# Patient Record
Sex: FEMALE | Employment: FULL TIME | URBAN - METROPOLITAN AREA
[De-identification: names, ages, dates, MRNs, and addresses within clinical notes are randomized per-mention and may not be internally consistent; named-entity substitution may affect disease eponyms.]

---

## 2018-03-13 ENCOUNTER — OFFICE VISIT (OUTPATIENT)
Dept: SURGERY | Age: 28
End: 2018-03-13

## 2018-03-13 VITALS
WEIGHT: 162 LBS | BODY MASS INDEX: 25.43 KG/M2 | SYSTOLIC BLOOD PRESSURE: 123 MMHG | DIASTOLIC BLOOD PRESSURE: 78 MMHG | HEART RATE: 83 BPM | HEIGHT: 67 IN

## 2018-03-13 DIAGNOSIS — N63.10 BREAST MASS, RIGHT: Primary | ICD-10-CM

## 2018-03-13 RX ORDER — DEXTROAMPHETAMINE SACCHARATE, AMPHETAMINE ASPARTATE MONOHYDRATE, DEXTROAMPHETAMINE SULFATE AND AMPHETAMINE SULFATE 7.5; 7.5; 7.5; 7.5 MG/1; MG/1; MG/1; MG/1
30 CAPSULE, EXTENDED RELEASE ORAL
COMMUNITY

## 2018-03-13 RX ORDER — NORETHINDRONE ACETATE AND ETHINYL ESTRADIOL .03; 1.5 MG/1; MG/1
TABLET ORAL
COMMUNITY

## 2018-03-13 RX ORDER — ESCITALOPRAM OXALATE 10 MG/1
10 TABLET ORAL DAILY
COMMUNITY
End: 2020-11-17 | Stop reason: ALTCHOICE

## 2018-03-13 NOTE — COMMUNICATION BODY
HISTORY OF PRESENT ILLNESS  Macarena Maya is a 32 y.o. female. HPI NEW patient referral for consultation by Dr. Kevin Nunez for a RIGHT breast lump palpated by her gyn and not palpable by the patient. The patient denies any nipple inversion or discharge. The patient has not had any imaging. Obstetric History     No data available      Obstetric Comments   Menarche:  12. LMP:3/7/2018 . # of Children: no.  Age at Delivery of First Child:  n/a. Hysterectomy/oophorectomy: no/no. Breast Bx:  none. Hx of Breast Feeding: na. BCP: yes currently. Hormone therapy: none. No family history of breast or ovarian cancer. No imaging     Review of Systems   Constitutional: Negative. HENT: Negative. Eyes: Negative. Respiratory: Negative. Cardiovascular: Negative. Gastrointestinal: Negative. Genitourinary: Negative. Musculoskeletal: Negative. Skin: Negative. Neurological: Negative. Endo/Heme/Allergies: Negative. Psychiatric/Behavioral: Negative. Physical Exam   Pulmonary/Chest: Right breast exhibits mass (4 cm dense lobular area LOQ. no discrete mass. no skin dimple). Right breast exhibits no inverted nipple, no nipple discharge, no skin change and no tenderness. Left breast exhibits no inverted nipple, no mass, no nipple discharge, no skin change and no tenderness. Breasts are symmetrical.       Lymphadenopathy:     She has no cervical adenopathy. She has no axillary adenopathy. Right: No supraclavicular adenopathy present. Left: No supraclavicular adenopathy present. BREAST ULTRASOUND  Indication: Right  breast mass LOQ  Technique: The area was scanned using a high-frequency linear-array near-field transducer  Findings: No abnormal mass, lesion, or shadowing noted. 1.6 cm long flat cyst deep in the breast 10:00 1/3. Incidental finding 11:00 1/3 8 mm oval hypoechoic mass without shadowing   Impression: 1. No suspicious mass  2.  Benign cyst.   3. Benign appearing mass 11;00 1/3  Disposition: repeat ultrasound RIGHT breast mass 11:00 1/3  ASSESSMENT and PLAN    ICD-10-CM ICD-9-CM    1. Breast mass, right N63.10 611.72      RIGHT breast mass is the edge of her normal dense breast tissue. No abnormal finding on ultrasound. Discussed self breast exam and explained what a noncancerous mass feels like (smooth, round and mobile) compared with a cancerous mass (hard, fixed, bumpy and often associated with a skin dimple).   PRN follow up

## 2018-03-13 NOTE — PROGRESS NOTES
HISTORY OF PRESENT ILLNESS  Sharifa Jarrett is a 32 y.o. female. HPI NEW patient referral for consultation by Dr. Cindy Cristina for a RIGHT breast lump palpated by her gyn and not palpable by the patient. The patient denies any nipple inversion or discharge. The patient has not had any imaging. Obstetric History     No data available      Obstetric Comments   Menarche:  12. LMP:3/7/2018 . # of Children: no.  Age at Delivery of First Child:  n/a. Hysterectomy/oophorectomy: no/no. Breast Bx:  none. Hx of Breast Feeding: na. BCP: yes currently. Hormone therapy: none. No family history of breast or ovarian cancer. No imaging     Review of Systems   Constitutional: Negative. HENT: Negative. Eyes: Negative. Respiratory: Negative. Cardiovascular: Negative. Gastrointestinal: Negative. Genitourinary: Negative. Musculoskeletal: Negative. Skin: Negative. Neurological: Negative. Endo/Heme/Allergies: Negative. Psychiatric/Behavioral: Negative. Physical Exam   Pulmonary/Chest: Right breast exhibits mass (4 cm dense lobular area LOQ. no discrete mass. no skin dimple). Right breast exhibits no inverted nipple, no nipple discharge, no skin change and no tenderness. Left breast exhibits no inverted nipple, no mass, no nipple discharge, no skin change and no tenderness. Breasts are symmetrical.       Lymphadenopathy:     She has no cervical adenopathy. She has no axillary adenopathy. Right: No supraclavicular adenopathy present. Left: No supraclavicular adenopathy present. BREAST ULTRASOUND  Indication: Right  breast mass LOQ  Technique: The area was scanned using a high-frequency linear-array near-field transducer  Findings: No abnormal mass, lesion, or shadowing noted. 1.6 cm long flat cyst deep in the breast 10:00 1/3. Incidental finding 11:00 1/3 8 mm oval hypoechoic mass without shadowing   Impression: 1. No suspicious mass  2.  Benign cyst.   3. Benign appearing mass 11;00 1/3  Disposition: repeat ultrasound RIGHT breast mass 11:00 1/3  ASSESSMENT and PLAN    ICD-10-CM ICD-9-CM    1. Breast mass, right N63.10 611.72      RIGHT breast mass is the edge of her normal dense breast tissue. No abnormal finding on ultrasound. Discussed self breast exam and explained what a noncancerous mass feels like (smooth, round and mobile) compared with a cancerous mass (hard, fixed, bumpy and often associated with a skin dimple).   PRN follow up

## 2018-03-13 NOTE — MR AVS SNAPSHOT
Rafael Dominguez 
 
 
 [place]  [place] [address] [facility] 
[phone] Patient: Rafi Knutson MRN: [id number] :1990 Visit Information Date & Time Provider Department Dept. Phone Encounter #  
 3/13/2018  2:00 PM Vivi Pulido MD [facility] [phone] [id number] Your Appointments 2018  1:45 PM  
Follow Up with Vivi Pulido MD  
[facility] Appt Note: 3 month follow up/ cp$ 3-13-18   
 [place]  [place] [facility] [address]  
  
   
 [place]  [place] [address] [facility] Upcoming Health Maintenance Date Due DTaP/Tdap/Td series (1 - Tdap) 10/23/2011 PAP AKA CERVICAL CYTOLOGY 10/23/2011 Influenza Age 5 to Adult 2017 Allergies as of 3/13/2018  Review Complete On: 3/13/2018 By: Vivi Pulido MD  
 No Known Allergies Current Immunizations  Never Reviewed No immunizations on file. Not reviewed this visit Vitals BP Pulse Height(growth percentile) Weight(growth percentile) LMP BMI  
 123/78 83 5' 7\" (1.702 m) 162 lb (73.5 kg) 2018 (Exact Date) 25.37 kg/m2 OB Status Smoking Status Having regular periods Never Smoker BMI and BSA Data Body Mass Index Body Surface Area  
 25.37 kg/m 2 1.86 m 2 Your Updated Medication List  
  
   
This list is accurate as of 3/13/18  3:41 PM.  Always use your most recent med list.  
  
  
  
  
 ADDERALL XR 30 mg XR capsule Generic drug:  amphetamine-dextroamphetamine XR Take 30 mg by mouth every morning. LEXAPRO 10 mg tablet Generic drug:  escitalopram oxalate Take 10 mg by mouth daily. LOESTRIN 1.5/30 (21) 1.5-30 mg-mcg Tab Generic drug:  norethindrone ac-eth estradiol Take  by mouth. Introducing [facility] [place] [facility]! [employer] introduces 56.com patient portal. Now you can access parts of your medical record, email your doctor's office, and request medication refills online. 1. In your internet browser, go to [email or web address] 2. Click on the First Time User? Click Here link in the Sign In box. You will see the New Member Sign Up page. 3. Enter your 56.com Access Code exactly as it appears below. You will not need to use this code after youve completed the sign-up process. If you do not sign up before the expiration date, you must request a new code. · [email or web address].com Access Code: [email or web address] Expires: 6/11/2018  1:52 PM 
 
4. Enter the last four digits of your Social Security Number (xxxx) and Date of Birth (mm/dd/yyyy) as indicated and click Submit. You will be taken to the next sign-up page. 5. Create a 56.com ID. This will be your 56.com login ID and cannot be changed, so think of one that is secure and easy to remember. 6. Create a 56.com password. You can change your password at any time. 7. Enter your Password Reset Question and Answer. This can be used at a later time if you forget your password. 8. Enter your e-mail address. You will receive e-mail notification when new information is available in [address] 9. Click Sign Up. You can now view and download portions of your medical record. 10. Click the Download Summary menu link to download a portable copy of your medical information. If you have questions, please visit the Frequently Asked Questions section of the 56.com website. Remember, 56.com is NOT to be used for urgent needs. For medical emergencies, dial 911. Now available from your iPhone and Android! Please provide this summary of care documentation to your next provider. Your primary care clinician is listed as Sergey Longo. If you have any questions after today's visit, please call [phone].

## 2018-08-06 ENCOUNTER — HOSPITAL ENCOUNTER (EMERGENCY)
Age: 28
Discharge: HOME | End: 2018-08-06
Attending: EMERGENCY MEDICINE
Payer: SELF-PAY

## 2018-08-06 VITALS
RESPIRATION RATE: 18 BRPM | BODY MASS INDEX: 25.72 KG/M2 | HEART RATE: 98 BPM | WEIGHT: 160.05 LBS | DIASTOLIC BLOOD PRESSURE: 88 MMHG | SYSTOLIC BLOOD PRESSURE: 141 MMHG | TEMPERATURE: 97.4 F | OXYGEN SATURATION: 100 % | HEIGHT: 66 IN

## 2018-08-06 DIAGNOSIS — H60.502 ACUTE OTITIS EXTERNA OF LEFT EAR, UNSPECIFIED TYPE: Primary | ICD-10-CM

## 2018-08-06 PROCEDURE — 74011250637 HC RX REV CODE- 250/637: Performed by: PHYSICIAN ASSISTANT

## 2018-08-06 PROCEDURE — 99284 EMERGENCY DEPT VISIT MOD MDM: CPT

## 2018-08-06 RX ORDER — NAPROXEN 250 MG/1
500 TABLET ORAL
Status: COMPLETED | OUTPATIENT
Start: 2018-08-06 | End: 2018-08-06

## 2018-08-06 RX ORDER — NAPROXEN 500 MG/1
500 TABLET ORAL
Qty: 20 TAB | Refills: 0 | Status: SHIPPED | OUTPATIENT
Start: 2018-08-06 | End: 2020-11-17 | Stop reason: ALTCHOICE

## 2018-08-06 RX ORDER — CIPROFLOXACIN AND DEXAMETHASONE 3; 1 MG/ML; MG/ML
4 SUSPENSION/ DROPS AURICULAR (OTIC) 2 TIMES DAILY
Qty: 7.5 ML | Refills: 0 | Status: SHIPPED | OUTPATIENT
Start: 2018-08-06 | End: 2018-08-13

## 2018-08-06 RX ORDER — AMOXICILLIN 500 MG/1
500 CAPSULE ORAL
Status: COMPLETED | OUTPATIENT
Start: 2018-08-06 | End: 2018-08-06

## 2018-08-06 RX ORDER — AMOXICILLIN 500 MG/1
500 TABLET, FILM COATED ORAL 3 TIMES DAILY
Qty: 30 TAB | Refills: 0 | Status: SHIPPED | OUTPATIENT
Start: 2018-08-06 | End: 2020-07-16

## 2018-08-06 RX ORDER — CIPROFLOXACIN AND FLUOCINOLONE ACETONIDE .75; .0625 MG/.25ML; MG/.25ML
0.25 SOLUTION AURICULAR (OTIC)
Status: COMPLETED | OUTPATIENT
Start: 2018-08-06 | End: 2018-08-06

## 2018-08-06 RX ADMIN — (CIPROFLOXACIN AND FLUOCINOLONE ACETONIDE) 0.25 ML: .75; .0625 SOLUTION AURICULAR (OTIC) at 21:44

## 2018-08-06 RX ADMIN — NAPROXEN 500 MG: 250 TABLET ORAL at 21:44

## 2018-08-06 RX ADMIN — AMOXICILLIN 500 MG: 500 CAPSULE ORAL at 21:44

## 2018-08-07 NOTE — DISCHARGE INSTRUCTIONS

## 2018-08-07 NOTE — ED PROVIDER NOTES
HPI Comments: 32 y.o. female with past medical history significant for anxiety, depression, ADD, who presents ambulatory to the ED accompanied by friend, with chief complaint of left ear pain since yesterday, 8/5/18. Patient states that her left ear pain started yesterday, but became significantly more severe at 0300 this morning. Describes the discomfort as a \"popping\" and \"throbbing\" sensation, which extends to the entire left side of the face. Throughout the day today her pain has been progressively worsening in severity, and she ranks her current level of discomfort in the left ear as an 8/10. Denies taking any pain medications PTA, but reports that she attempted to use neomycin and polymyxin ear drops last night, but they would not go into the ear canal itself. 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 States that she has a history of frequent ear infections, and her last ear infection was treated ~8 months ago. When her current symptoms started, patient made an appointment to see her PCP on Friday 8/10/18, but due to her severe pain she decided to come to the ED. Has never seen ENT or other specialist about this problem in the past. She specifically denies any ear drainage, fevers, or chills. Denies any other sick symptoms. There are no other acute medical concerns at this time. Social hx: Negative for Tobacco use; Positive for EtOH use (occasional); Negative for Illicit Drug Abuse    PCP: Annie Robison MD    Note written by Wendy Cortes. Luis Zavaleta, as dictated by Anastacio Monzon PA-C  9:22 PM     The history is provided by the patient. No  was used. Past Medical History:   Diagnosis Date    ADD (attention deficit disorder)     Anxiety and depression        History reviewed. No pertinent surgical history. History reviewed. No pertinent family history.     Social History     Social History    Marital status: SINGLE     Spouse name: N/A  Number of children: N/A    Years of education: N/A     Occupational History    Not on file. Social History Main Topics    Smoking status: Never Smoker    Smokeless tobacco: Never Used    Alcohol use Yes    Drug use: No    Sexual activity: Not on file     Other Topics Concern    Not on file     Social History Narrative         ALLERGIES: Review of patient's allergies indicates no known allergies. Review of Systems   Constitutional: Negative. Negative for chills and fever. HENT: Positive for ear pain (left). Negative for ear discharge. Eyes: Negative for photophobia, pain, discharge and visual disturbance. Respiratory: Negative for apnea, cough, chest tightness and shortness of breath. Cardiovascular: Negative for chest pain, palpitations and leg swelling. Gastrointestinal: Negative for abdominal distention, abdominal pain and blood in stool. Genitourinary: Negative for difficulty urinating, dysuria, flank pain, frequency and hematuria. Musculoskeletal: Negative for back pain, gait problem, joint swelling, myalgias and neck pain. Skin: Negative for color change and pallor. Neurological: Negative for dizziness, syncope, weakness, numbness and headaches. Psychiatric/Behavioral: Negative for behavioral problems and confusion. The patient is not nervous/anxious. Vitals:    08/06/18 2059   BP: (!) 151/94   Pulse: 85   Resp: 16   Temp: 97.7 °F (36.5 °C)   SpO2: 100%   Weight: 72.6 kg (160 lb 0.9 oz)   Height: 5' 6\" (1.676 m)            Physical Exam   Constitutional: She is oriented to person, place, and time. She appears well-nourished. No distress. HENT:   Head: Normocephalic and atraumatic. Right Ear: Tympanic membrane, external ear, and ear canal normal.   Left Ear: Mild swelling to left ear canal. Unable to visualize TM secondary to swelling. No mastoid tenderness. No drainage noted. Eyes: Pupils are equal, round, and reactive to light.    Neck: Normal range of motion. Cardiovascular: Normal rate and regular rhythm. Pulmonary/Chest: Effort normal and breath sounds normal.   Abdominal: Soft. There is no tenderness. Musculoskeletal: Normal range of motion. She exhibits no edema or tenderness. Neurological: She is alert and oriented to person, place, and time. Skin: Skin is warm and dry. Psychiatric: She has a normal mood and affect. Nursing note and vitals reviewed. Note written by Yamileth Liu. Casper Barbour, as dictated by Wesley Molnia PA-C  9:22 PM      MDM  Number of Diagnoses or Management Options  Acute otitis externa of left ear, unspecified type:      Amount and/or Complexity of Data Reviewed  Discuss the patient with other providers: yes          ED Course       Procedures    Patient has been reassessed. Feeling much better. Reviewed medications with patient. Ready to discharge home. 9:38 PM  Patient's results have been reviewed with them. Patient and/or family have verbally conveyed their understanding and agreement of the patient's signs, symptoms, diagnosis, treatment and prognosis and additionally agree to follow up as recommended or return to the Emergency Room should their condition change prior to follow-up. Discharge instructions have also been provided to the patient with some educational information regarding their diagnosis as well a list of reasons why they would want to return to the ER prior to their follow-up appointment should their condition change.   DEANNA Bartlett

## 2018-08-07 NOTE — ED NOTES
Discharge instructions given to patient by PA/RN. Patient verbalizes understanding of discharge instructions and medications. Questions answered. Patient ambulated off unit with no signs of acute distress. Home to self.

## 2018-08-07 NOTE — ED TRIAGE NOTES
Patient reports LEFT ear pain that began at 3am. Patient denies any medications prior to arrival. 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 Scheduled to see PCP Friday.

## 2020-07-16 ENCOUNTER — DOCUMENTATION ONLY (OUTPATIENT)
Dept: SURGERY | Age: 30
End: 2020-07-16

## 2020-07-16 ENCOUNTER — OFFICE VISIT (OUTPATIENT)
Dept: SURGERY | Age: 30
End: 2020-07-16

## 2020-07-16 VITALS
TEMPERATURE: 98.4 F | HEART RATE: 106 BPM | BODY MASS INDEX: 25.71 KG/M2 | SYSTOLIC BLOOD PRESSURE: 136 MMHG | DIASTOLIC BLOOD PRESSURE: 80 MMHG | HEIGHT: 66 IN | WEIGHT: 160 LBS

## 2020-07-16 DIAGNOSIS — N63.10 BREAST MASS, RIGHT: Primary | ICD-10-CM

## 2020-07-16 DIAGNOSIS — R92.8 ABNORMAL ULTRASOUND OF BREAST: ICD-10-CM

## 2020-07-16 NOTE — PROGRESS NOTES
The patient's breast specimen was called to [facility] for . Confirmation number [id number].

## 2020-07-16 NOTE — PROGRESS NOTES
[facility]  OFFICE PROCEDURE PROGRESS NOTE        Chart reviewed for the following:   I, Dr. William Rivas have reviewed the History, Physical and updated the Allergic reactions for [place] performed immediately prior to start of procedure:   I, Dr. William Rivas, have performed the following reviews on [facility] prior to the start of the procedure:            * Patient was identified by name and date of birth   * Agreement on procedure being performed was verified  * Risks and Benefits explained to the patient  * Procedure site verified and marked as necessary  * Patient was positioned for comfort  * Consent was signed and verified     Time: 1100am      Date of procedure: 7/16/2020    Procedure performed by:  Dr. William Rivas    Provider assisted by: Shazia Smart RN    Patient assisted by:Katherine Ortega RN    How tolerated by patient: Patient tolerated the procedure well without complications. Denies pain post biopsy. Post Procedural Pain Scale: 0 none    Comments: Written and verbal post biopsy instructions reviewed with and given to patient with her understanding.

## 2020-07-16 NOTE — PROGRESS NOTES
HISTORY OF PRESENT ILLNESS  Gilford Journey is a 34 y.o. female. HPI ESTABLISHED patient referred by Dr Sixto Hedrick for a RIGHT breast mass that Dr Sixto Hedrick found on physical exam. The mass was tender with palpation. Pt does not do SBE. The patient denies any nipple inversion or discharge. BREAST ULTRASOUND 3/2018  Indication: Right  breast mass LOQ  Technique: The area was scanned using a high-frequency linear-array near-field transducer  Findings: No abnormal mass, lesion, or shadowing noted. 1.6 cm long flat cyst deep in the breast 10:00 1/3. Incidental finding 11:00 1/3 8 mm oval hypoechoic mass without shadowing   Impression: 1. No suspicious mass  2. Benign cyst.   3. Benign appearing mass 11;00 1/3  Disposition: repeat ultrasound RIGHT breast mass 11:00 1/3    Past Medical History:   Diagnosis Date    ADD (attention deficit disorder)     Anxiety and depression      No past surgical history on file. OB History    No obstetric history on file. Obstetric Comments   Menarche:  12. LMP:3/7/2018 . # of Children: no.  Age at Delivery of First Child:  n/a. Hysterectomy/oophorectomy: no/no. Breast Bx:  none. Hx of Breast Feeding: na. BCP: yes currently. Hormone therapy: none. No family history on file. Social History     Tobacco Use    Smoking status: Never Smoker    Smokeless tobacco: Never Used   Substance Use Topics    Alcohol use: Yes      Prior to Admission medications    Medication Sig Start Date End Date Taking? Authorizing Provider   norethindrone ac-eth estradiol (LOESTRIN 1.5/30, 21,) 1.5-30 mg-mcg tab Take  by mouth. Yes Provider, Historical   amphetamine-dextroamphetamine XR (ADDERALL XR) 30 mg XR capsule Take 30 mg by mouth every morning. Yes Provider, Historical   naproxen (NAPROSYN) 500 mg tablet Take 1 Tab by mouth every twelve (12) hours as needed for Pain. 8/6/18   DEANNA English   escitalopram oxalate (LEXAPRO) 10 mg tablet Take 10 mg by mouth daily. Provider, Historical      No Known Allergies    ROS    Physical Exam  Chest:      Breasts: Breasts are symmetrical.         Right: Mass (11:00 1/3 2 cm oval mobile mass. 9:00 2/3 1 cm hard dense mass ) present. No inverted nipple, nipple discharge, skin change or tenderness. Left: No inverted nipple, mass, nipple discharge, skin change or tenderness. Lymphadenopathy:      Upper Body:      Right upper body: No supraclavicular or axillary adenopathy. Left upper body: No supraclavicular or axillary adenopathy. US - Guided Core Biopsy  Indication : Right Breast mass seen on ultrasound. Ultrasound Findings: 1. RIGHT (palpable) 11:00 1/3 1.4cm x 1.56cm oval hypoechoic mass with through transmission. 2. RIGHT (not palpable) 11:00 2/3 4mm oval hypoechoic mass with through transmission (decreased in size since 3/2018). 3. 1.6cm long flat cyst deep in the breast 10:00 1/3 unchanged from exam 3/2018  4. Normal dense breast tissue corresponds with palpable density 9:00 3/3  Prep : alcohol. Anesthesia : 1% lidocaine with epinephrine, 6cc. Device : The hand-held 10 gauge BARD needle was inserted through the 11:00 mass and captured tissue with real-time Ultrasound Confirmation. Core Sampling :  2 cores were obtained. Marker: clip placed   Dressing : Steristrips, gauze and tape. Instructions : Remove gauze this evening. Remove steristrips in one week. Tolerance: Pt tolerated procedure with no discomfort  Pathology : SCLEROSING ADENOSIS, PSEUDOANGIOMATOUS STROMAL   HYPERPLASIA AND COLUMNAR CELL CHANGE. NEGATIVE FOR ATYPIA   AND MALIGNANCY. Concordance: yes  ASSESSMENT and PLAN    ICD-10-CM ICD-9-CM    1. Breast mass, right  N63.10 611.72    2. Abnormal ultrasound of breast  R92.8 793.89       1. Palpable RIGHT breast mass 9:00 is normal dense breast tissue. 2. Palpable RIGHT breast mass 11:00 biopsied today. 3. Pt has dense nodular breast tissue. She does not like to do SBE. Ultrasound is a good modality if masses are felt on physician exam.   at [facility] patient  Pathology is benign  Will recheck in 6 months with physical exam and ultrasound. Pt is comfortable with observation.    Consider surgical excision if the lesions are growing

## 2020-07-17 LAB
CPT CODES, 490044: NORMAL
CPT DISCLAIMER: NORMAL
CYTOLOGY SPEC DOC CYTO: NORMAL
DIAGNOSIS SYNOPSIS:: NORMAL
DX ICD CODE: NORMAL
DX ICD CODE: NORMAL
PATH REPORT.GROSS SPEC: NORMAL
PATHOLOGIST NAME: NORMAL
PDF IMAGE, 807507: NORMAL
SPECIMEN SOURCE: NORMAL

## 2020-11-17 ENCOUNTER — OFFICE VISIT (OUTPATIENT)
Dept: SURGERY | Age: 30
End: 2020-11-17
Payer: COMMERCIAL

## 2020-11-17 VITALS
BODY MASS INDEX: 24.11 KG/M2 | HEIGHT: 66 IN | DIASTOLIC BLOOD PRESSURE: 78 MMHG | WEIGHT: 150 LBS | SYSTOLIC BLOOD PRESSURE: 122 MMHG | TEMPERATURE: 97.2 F | HEART RATE: 87 BPM

## 2020-11-17 DIAGNOSIS — N63.10 BREAST MASS, RIGHT: Primary | ICD-10-CM

## 2020-11-17 PROCEDURE — 76642 ULTRASOUND BREAST LIMITED: CPT | Performed by: SURGERY

## 2020-11-17 PROCEDURE — 99213 OFFICE O/P EST LOW 20 MIN: CPT | Performed by: SURGERY

## 2020-11-17 NOTE — PATIENT INSTRUCTIONS

## 2020-11-17 NOTE — LETTER
11/17/20 Patient: Isabelle Alcaraz YOB: 1990 Date of Visit: 11/17/2020 Felice Lanes, MD 
[address] [place] [id number] VIA Facsimile: [phone] Dear Felice Lanes, MD, Thank you for referring Ms. Katherine Locke to [facility] [facility] for evaluation. My notes for this consultation are attached. If you have questions, please do not hesitate to call me. I look forward to following your patient along with you.  
 
 
Sincerely, 
 
Uma Barragan MD

## 2020-11-17 NOTE — PROGRESS NOTES
HISTORY OF PRESENT ILLNESS  Dominga Martinez is a 27 y.o. female. HPI  ESTABLISHED patient here for follow-up of RIGHT breast mass, S/P biopsy in 7/2020. She is not having any pain and does not feel the mass because she does not do self-breast exams. The mass was initially identified on physical exam by Dr Ana Grant   7/16/2020:    Ultrasound: RIGHT (palpable) 11:00 1/3 1.4cm x 1.56cm oval hypoechoic mass with through transmission. Specimen A-Breast Biopsy, Core, breast, right, 11:00:   SCLEROSING ADENOSIS, PSEUDOANGIOMATOUS STROMAL HYPERPLASIA AND COLUMNAR CELL CHANGE. NEGATIVE FOR ATYPIA AND MALIGNANCY. ROS    Physical Exam  Chest:      Breasts: Breasts are symmetrical.         Right: Mass (dense breast tissue. no discrete mass) present. No inverted nipple, nipple discharge, skin change or tenderness. Left: No inverted nipple, mass, nipple discharge, skin change or tenderness. Lymphadenopathy:      Upper Body:      Right upper body: No supraclavicular or axillary adenopathy. Left upper body: No supraclavicular or axillary adenopathy. BREAST ULTRASOUND  Indication: RIGHT breast dense mass  Technique: The RIGHT breast and axilla were scanned using a high-frequency linear-array near-field transducer  Findings: 1.5cm x 1. 5cm oval hypoechoic mass with through transmission. Biopsy clip is visible in the mass. Impression: stable PASH   Disposition: No intervention  ASSESSMENT and PLAN    ICD-10-CM ICD-9-CM    1. Breast mass, right  N63.10 611.72      RIGHT breast mass is unchanged. It is not palpable and asymptomatic. No further invention  Discussed self breast exam and explained what a noncancerous mass feels like (smooth, round and mobile) compared with a cancerous mass (hard, fixed, bumpy and often associated with a skin dimple). PRN follow up  Start mammograms age 36.

## 2023-05-16 RX ORDER — DEXTROAMPHETAMINE SACCHARATE, AMPHETAMINE ASPARTATE MONOHYDRATE, DEXTROAMPHETAMINE SULFATE AND AMPHETAMINE SULFATE 7.5; 7.5; 7.5; 7.5 MG/1; MG/1; MG/1; MG/1
30 CAPSULE, EXTENDED RELEASE ORAL
COMMUNITY

## 2023-05-16 RX ORDER — NORETHINDRONE ACETATE AND ETHINYL ESTRADIOL .03; 1.5 MG/1; MG/1
TABLET ORAL
COMMUNITY

## 2024-01-30 ENCOUNTER — OFFICE VISIT (OUTPATIENT)
Age: 34
End: 2024-01-30
Payer: COMMERCIAL

## 2024-01-30 DIAGNOSIS — N63.11 MASS OF UPPER OUTER QUADRANT OF RIGHT BREAST: Primary | ICD-10-CM

## 2024-01-30 PROCEDURE — 99203 OFFICE O/P NEW LOW 30 MIN: CPT | Performed by: SURGERY

## 2024-01-30 PROCEDURE — 76642 ULTRASOUND BREAST LIMITED: CPT | Performed by: SURGERY

## 2024-01-30 NOTE — PROGRESS NOTES
HISTORY OF PRESENT ILLNESS  Blayne Martinez is a 33 y.o. female     HPI NEW patient consult referred by  Dr. Yelena Washburn for RIGHT breast lump. Pt was seen by OB last Tuesday. Lump was felt , recommendation to have US . Pt denies pain or skin changes.    Pt last seen Dr. Mendez 11/17/2020 RIGHT breast mass, PASH    Right breast bx - 7/16/2020     No family hx of breast or ovarian cancer     11/17/2020  BREAST ULTRASOUND   Indication: RIGHT breast dense mass   Technique:  The RIGHT breast and axilla were scanned using a high-frequency linear-array near-field transducer   Findings: 1.5cm x 1. 5cm oval hypoechoic mass with through transmission.  Biopsy clip is visible in the mass.     Impression: stable PASH    Disposition: No intervention      Review of Systems      Physical Exam  Chest:   Breasts:     Right: Mass (11:00 1/3 RIGHT breast mass (PASH), unchanged. Ridge of dense breast tissue UOQ) present. No swelling, skin change or tenderness.      Left: No swelling, mass, skin change or tenderness.          Comments: RIGHT 10:00 2/3 1.2cm simple cyst (not palpable)    Bilateral nodular breast tissue  Lymphadenopathy:      Upper Body:      Right upper body: No axillary adenopathy.      Left upper body: No axillary adenopathy.        BREAST ULTRASOUND  Indication: RIGHT breast mass UOQ  Technique:  The RIGHT breast and axilla were scanned using a high-frequency linear-array near-field transducer  Findings:  RIGHT breast mass 11:00 1/3 oval hypoechoic mass with biopsy clip (PASH)  New RIGHT breast cysts 10:00 2/3, deep 1.2cm   Ridge of dense breast tissue overlying the cyst corresponds with palpable density.    Impression:   PASH, unchanged  New simple cyst  Dense breast tissue   Disposition: No worrisome finding on ultrasound     ASSESSMENT and PLAN   Diagnosis Orders   1. Mass of upper outer quadrant of right breast        I spent 30 minutes reviewing previous notes and test results, face to face with the patient